# Patient Record
Sex: FEMALE | Race: WHITE | NOT HISPANIC OR LATINO | URBAN - METROPOLITAN AREA
[De-identification: names, ages, dates, MRNs, and addresses within clinical notes are randomized per-mention and may not be internally consistent; named-entity substitution may affect disease eponyms.]

---

## 2023-12-07 ENCOUNTER — TELEPHONE (OUTPATIENT)
Dept: HEMATOLOGY ONCOLOGY | Facility: CLINIC | Age: 38
End: 2023-12-07

## 2023-12-07 NOTE — TELEPHONE ENCOUNTER
I called Brandon Plata in response to a referral that was received for patient to establish care with [facility]nec[facility]cology. Outreach was made to complete patient's intake questionnaire . Patient's intake questionnaire was reviewed and complete. Patient's intake has been sent to the team for clinical review.

## 2023-12-19 ENCOUNTER — OFFICE VISIT (OUTPATIENT)
Dept: GYNECOLOGIC ONCOLOGY | Facility: CLINIC | Age: 38
End: 2023-12-19
Payer: COMMERCIAL

## 2023-12-19 VITALS
HEIGHT: 60 IN | OXYGEN SATURATION: 98 % | TEMPERATURE: 96.8 F | RESPIRATION RATE: 16 BRPM | HEART RATE: 101 BPM | WEIGHT: 212 LBS | DIASTOLIC BLOOD PRESSURE: 68 MMHG | SYSTOLIC BLOOD PRESSURE: 124 MMHG | BODY MASS INDEX: 41.62 KG/M2

## 2023-12-19 DIAGNOSIS — N93.9 ABNORMAL UTERINE BLEEDING (AUB): ICD-10-CM

## 2023-12-19 DIAGNOSIS — N83.8 LEFT TUBO-OVARIAN MASS: Primary | ICD-10-CM

## 2023-12-19 DIAGNOSIS — I10 PRIMARY HYPERTENSION: ICD-10-CM

## 2023-12-19 PROCEDURE — 58100 BIOPSY OF UTERUS LINING: CPT | Performed by: STUDENT IN AN ORGANIZED HEALTH CARE EDUCATION/TRAINING PROGRAM

## 2023-12-19 PROCEDURE — 99244 OFF/OP CNSLTJ NEW/EST MOD 40: CPT | Performed by: OBSTETRICS & GYNECOLOGY

## 2023-12-19 RX ORDER — ACETAMINOPHEN 325 MG/1
975 TABLET ORAL ONCE
OUTPATIENT
Start: 2023-12-19 | End: 2023-12-19

## 2023-12-19 RX ORDER — SODIUM CHLORIDE, SODIUM LACTATE, POTASSIUM CHLORIDE, CALCIUM CHLORIDE 600; 310; 30; 20 MG/100ML; MG/100ML; MG/100ML; MG/100ML
125 INJECTION, SOLUTION INTRAVENOUS CONTINUOUS
OUTPATIENT
Start: 2023-12-19

## 2023-12-19 RX ORDER — LEVOTHYROXINE SODIUM 0.03 MG/1
25 TABLET ORAL DAILY
COMMUNITY
Start: 2023-09-26

## 2023-12-19 RX ORDER — CEFAZOLIN SODIUM 2 G/50ML
2000 SOLUTION INTRAVENOUS ONCE
OUTPATIENT
Start: 2023-12-19 | End: 2023-12-19

## 2023-12-19 RX ORDER — HEPARIN SODIUM 5000 [USP'U]/ML
5000 INJECTION, SOLUTION INTRAVENOUS; SUBCUTANEOUS
OUTPATIENT
Start: 2023-12-20 | End: 2023-12-21

## 2023-12-19 RX ORDER — GABAPENTIN 100 MG/1
100 CAPSULE ORAL ONCE
OUTPATIENT
Start: 2023-12-19 | End: 2023-12-19

## 2023-12-19 RX ORDER — COLISTIN SULFATE, NEOMYCIN SULFATE, THONZONIUM BROMIDE AND HYDROCORTISONE ACETATE 3; 3.3; .5; 1 MG/ML; MG/ML; MG/ML; MG/ML
4 SUSPENSION AURICULAR (OTIC) 3 TIMES DAILY
COMMUNITY
Start: 2023-10-04 | End: 2023-12-19

## 2023-12-19 RX ORDER — AMLODIPINE BESYLATE 5 MG/1
5 TABLET ORAL EVERY MORNING
COMMUNITY
Start: 2023-09-14

## 2023-12-19 RX ORDER — AMLODIPINE BESYLATE 10 MG/1
10 TABLET ORAL DAILY
COMMUNITY
Start: 2023-11-25

## 2023-12-19 RX ORDER — FLUOXETINE HYDROCHLORIDE 20 MG/1
1 CAPSULE ORAL EVERY MORNING
COMMUNITY
Start: 2023-09-13

## 2023-12-19 NOTE — ASSESSMENT & PLAN NOTE
38 y.o.  with HTN, prior CS x2, 12.7cm left ovarian mass with low-level internal echoes consistent with endometrioma on imaging and AUB who presents for consultation for definitive surgical management.     I reviewed the differential diagnosis of ovarian cysts including benign such as dermoid or functional, borderline, malignancy.  Based on MRI report, the cyst most likely represents a benign endometrioma.     Given that patient has completed childbearing and also has ongoing abnormal uterine bleeding. We have recommended robotic-assisted total laparoscopic hysterectomy, left salpingoophorectomy, right salpingectomy, with intraoperative frozen section analysis, possible staging, possible exploratory laparotomy and all other indicated procedures.  She understands the risks and benefits of the operation and agrees to proceed as outlined.  Consent for surgery was obtained by me in the office.     We discussed the implications of endometriosis and associated inflammation and/or scarring rendering the surgery more difficult. We reviewed that we will plan to leave the right ovary in situ unless it appears grossly abnormal or is morbidly adherent to the uterus. She understands that bilateral oophorectomy would place her in surgical menopause. We discussed that we will try to avoid this if possible.     We discussed perioperative management of medications and preadmission testing.    Thank you for the courtesy of this consultation.  All questions were answered by the end of the visit.

## 2023-12-19 NOTE — ASSESSMENT & PLAN NOTE
We reviewed the possible etiologies of her AUB, including polyp, adenomyosis, leiomyoma, hyperplasia/malignancy, coagulopathy, ovulatory dysfunction, endometrial, iatrogenic.   We reviewed in women ages 19-39, AUB is usually as a result of pregnancy, structural lesions, endometrial hyperplasia, or hormonal contraception.  In women age 40 to menopause, the most common cause is anovulatory bleeding as a result of declining ovarian function. Pelvic ultrasound showed no abnormality in the endometrium.     Endometrial biopsy was attempted today but was unable to be obtained secondary to cervical stenosis. As such, we will send the uterus for frozen pathology if abnormal appearing.

## 2023-12-19 NOTE — PATIENT INSTRUCTIONS
1.  Please shower the night before planned surgery.  You may choose to use antimicrobial soap.   2. Do not eat solid food after midnight the evening prior to surgery.  You may continue to clear drink liquids up until 3 hr prior to scheduled surgery.  3.  You may take all your normal medications the morning of surgery.   4. Please with holdsany nonsteroidal anti-inflammatory drugs such as Motrin Advil aspirin Aleve ibuprofen or prescription versions of these for 7 days prior to surgery.

## 2023-12-19 NOTE — ASSESSMENT & PLAN NOTE
Patient reports intermittent dizziness. She reports she has recently uptitrated her amlodipine to 10mg daily. Recommended pt to review dosage with her PCP given dizziness. The dizziness is unlikely to be related to her mass.

## 2023-12-19 NOTE — PROGRESS NOTES
Assessment/Plan:    Problem List Items Addressed This Visit       Left tubo-ovarian mass - Primary     38 y.o.  with HTN, prior CS x2, 12.7cm left ovarian mass with low-level internal echoes consistent with endometrioma on imaging and AUB who presents for consultation for definitive surgical management.     I reviewed the differential diagnosis of ovarian cysts including benign such as dermoid or functional, borderline, malignancy.  Based on MRI report, the cyst most likely represents a benign endometrioma.     Given that patient has completed childbearing and also has ongoing abnormal uterine bleeding. We have recommended robotic-assisted total laparoscopic hysterectomy, left salpingoophorectomy, right salpingectomy, with intraoperative frozen section analysis, possible staging, possible exploratory laparotomy and all other indicated procedures.  She understands the risks and benefits of the operation and agrees to proceed as outlined.  Consent for surgery was obtained by me in the office.     We discussed the implications of endometriosis and associated inflammation and/or scarring rendering the surgery more difficult. We reviewed that we will plan to leave the right ovary in situ unless it appears grossly abnormal or is morbidly adherent to the uterus. She understands that bilateral oophorectomy would place her in surgical menopause. We discussed that we will try to avoid this if possible.     We discussed perioperative management of medications and preadmission testing.    Thank you for the courtesy of this consultation.  All questions were answered by the end of the visit.           Relevant Orders    Case request operating room: HYSTERECTOMY LAPAROSCOPIC TOTAL (LTH) W/ ROBOTICS, BILATERAL SALPINGECTOMY, LEFT OOPHORECTOMY, POSSIBLE RIGHT OOPHORECTOMY, POSSIBLE STAGING, EXPLORATORY LAPAROTOMY, ALL OTHER INDICATED PROCEDURES (Completed)    Type and screen    Basic metabolic panel    CBC and Platelet     HEMOGLOBIN A1C W/ EAG ESTIMATION    EKG 12 lead    Provide patient education for GYN procedure    Provide patient education on NPO Guidelines    Abnormal uterine bleeding (AUB)     We reviewed the possible etiologies of her AUB, including polyp, adenomyosis, leiomyoma, hyperplasia/malignancy, coagulopathy, ovulatory dysfunction, endometrial, iatrogenic.   We reviewed in women ages 19-39, AUB is usually as a result of pregnancy, structural lesions, endometrial hyperplasia, or hormonal contraception.  In women age 40 to menopause, the most common cause is anovulatory bleeding as a result of declining ovarian function. Pelvic ultrasound showed no abnormality in the endometrium.     Endometrial biopsy was attempted today but was unable to be obtained secondary to cervical stenosis. As such, we will send the uterus for frozen pathology if abnormal appearing.          Relevant Orders    Case request operating room: HYSTERECTOMY LAPAROSCOPIC TOTAL (LTH) W/ ROBOTICS, BILATERAL SALPINGECTOMY, LEFT OOPHORECTOMY, POSSIBLE RIGHT OOPHORECTOMY, POSSIBLE STAGING, EXPLORATORY LAPAROTOMY, ALL OTHER INDICATED PROCEDURES (Completed)    Endometrial biopsy    Primary hypertension     Patient reports intermittent dizziness. She reports she has recently uptitrated her amlodipine to 10mg daily. Recommended pt to review dosage with her PCP given dizziness. The dizziness is unlikely to be related to her mass.          Relevant Medications    amLODIPine (NORVASC) 10 mg tablet    amLODIPine (NORVASC) 5 mg tablet           CHIEF COMPLAINT: left ovarian cyst     Oncology Problem:   Cancer Staging   No matching staging information was found for the patient.      Previous therapy:  Oncology History    No history exists.       Most recent imaging:  Outside imaging reports reviewed in Care Everywhere  TVUS  FINDINGS:   LMP: 11/30/2023, day 6 of the cycle     UTERUS: 8.7 cm x 5.0 cm x 5.8 cm     MYOMETRIUM: Homogeneous     ENDOMETRIUM: 8.7 mm in  thickness, within normal limits.     RIGHT OVARY: 3.6 cm x 2.1 cm x 3.3 cm, 13.2 ml   Unremarkable     LEFT OVARY: 12.7 cm x 8.4 cm x 10.6 cm, 588.3 ml   -11.7 x 9.8 x 8.0 cm anechoic lesion with low level internal echoes previously 12.1 cm on prior MRI.     MRI Pelvis  IMPRESSION   1. Large complex cystic lesion in the left ovary, possibly an endometrioma, though mucinous cystadenoma cannot be excluded.  Recommend gyn surgical consultation.   2. Small foci of T1 hyperintensity in the left ovary, which may represent small endometriomas.   3. Tubular region of T1 hyperintensity in the left adnexa, suggestive of a mild hematosalpinx.       Patient ID: Anabel Alvarado is a 38 y.o. female  With abdominal pain, AUB in setting of large 12.7cm left ovarian cyst who presents as a consultation for surgical management.     Patient reports she started noticing pain in August and was sent for CT which showed an ovarian mass. She was then sent for MRI which confirmed left ovarian mass, possible endometrioma and possible hematosalpinx. She then saw Dr. Mitchell for second opinion. Tumor markers were wnl. She continues to have intermittent crampy pain that is cyclic. She also notes heavy menstrual bleeding, passage of clots and intermenstrual bleeding since August. She also notes weight gain, early satiety. 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  She has completed childbearing and desires definitive surgical management.         Review of Systems   Constitutional:  Positive for appetite change. Negative for activity change, chills and fever.   Respiratory:  Negative for shortness of breath.    Cardiovascular:  Negative for chest pain.   Gastrointestinal:  Positive for abdominal pain. Negative for nausea and vomiting.   Genitourinary:  Positive for menstrual problem and pelvic pain.   Musculoskeletal:  Negative for gait problem.  "  Neurological:  Positive for light-headedness. Negative for dizziness and syncope.   Psychiatric/Behavioral:  The patient is nervous/anxious.        Current Outpatient Medications   Medication Sig Dispense Refill    Aloe LIQD Take 4 fluid ounces by mouth every morning      amLODIPine (NORVASC) 10 mg tablet Take 10 mg by mouth daily      FLUoxetine (PROzac) 20 mg capsule Take 1 capsule by mouth every morning      levothyroxine 25 mcg tablet Take 25 mcg by mouth daily      amLODIPine (NORVASC) 5 mg tablet Take 5 mg by mouth every morning (Patient not taking: Reported on 12/19/2023)       No current facility-administered medications for this visit.       Allergies   Allergen Reactions    Sulfa Antibiotics Rash    Sulfamethoxazole-Trimethoprim Rash       No past medical history on file.    No past surgical history on file.    OB History    No obstetric history on file.         No family history on file.    The following portions of the patient's history were reviewed and updated as appropriate: allergies, current medications, past family history, past medical history, past social history, past surgical history, and problem list.      Objective:    Blood pressure 124/68, pulse 101, temperature (!) 96.8 °F (36 °C), temperature source Temporal, resp. rate 16, height 5' (1.524 m), weight 96.2 kg (212 lb), SpO2 98%.  Body mass index is 41.4 kg/m².    Physical Exam      No results found for: \"\"  No results found for: \"WBC\", \"HGB\", \"HCT\", \"MCV\", \"PLT\"  No results found for: \"NA\", \"K\", \"CL\", \"CO2\", \"ANIONGAP\", \"BUN\", \"CREATININE\", \"GLUCOSE\", \"GLUF\", \"CALCIUM\", \"CORRECTEDCA\", \"AST\", \"ALT\", \"ALKPHOS\", \"PROT\", \"BILITOT\", \"EGFR\"     Trend:  No results found for: \"\"    Endometrial biopsy    Date/Time: 12/19/2023 2:15 PM    Performed by: Gabriel Jeter MD  Authorized by: Shadi Strauss MD  Universal Protocol:  Consent: Verbal consent obtained.  Risks and benefits: risks, benefits and alternatives were " discussed  Consent given by: patient    Indication:     Indications: Other disorder of menstruation and other abnormal bleeding from female genital tract    Procedure:     Procedure: endometrial biopsy with Pipelle      A bivalve speculum was placed in the vagina: yes    Findings:     Uterus size:  9-10 weeks    Cervix: stenotic    Comments:     Procedure comments:  Cervix visualized and displaced and angled downward secondary to mass. Anterior lip of the cervix grasped with tenaculum, however unable to advance pipelle or os finder past internal os. Procedure was then terminated, tenaculum removed and noted to be hemostatic.

## 2024-01-21 ENCOUNTER — PATIENT MESSAGE (OUTPATIENT)
Dept: GYNECOLOGIC ONCOLOGY | Facility: CLINIC | Age: 39
End: 2024-01-21

## 2024-01-30 ENCOUNTER — APPOINTMENT (OUTPATIENT)
Dept: LAB | Facility: CLINIC | Age: 39
End: 2024-01-30
Payer: COMMERCIAL

## 2024-01-30 DIAGNOSIS — N83.8 LEFT TUBO-OVARIAN MASS: ICD-10-CM

## 2024-01-30 LAB
ANION GAP SERPL CALCULATED.3IONS-SCNC: 8 MMOL/L
BUN SERPL-MCNC: 8 MG/DL (ref 5–25)
CALCIUM SERPL-MCNC: 8.7 MG/DL (ref 8.4–10.2)
CHLORIDE SERPL-SCNC: 103 MMOL/L (ref 96–108)
CO2 SERPL-SCNC: 29 MMOL/L (ref 21–32)
CREAT SERPL-MCNC: 0.77 MG/DL (ref 0.6–1.3)
ERYTHROCYTE [DISTWIDTH] IN BLOOD BY AUTOMATED COUNT: 13.2 % (ref 11.6–15.1)
EST. AVERAGE GLUCOSE BLD GHB EST-MCNC: 100 MG/DL
GFR SERPL CREATININE-BSD FRML MDRD: 96 ML/MIN/1.73SQ M
GLUCOSE SERPL-MCNC: 93 MG/DL (ref 65–140)
HBA1C MFR BLD: 5.1 %
HCT VFR BLD AUTO: 39.3 % (ref 34.8–46.1)
HGB BLD-MCNC: 13 G/DL (ref 11.5–15.4)
MCH RBC QN AUTO: 27.5 PG (ref 26.8–34.3)
MCHC RBC AUTO-ENTMCNC: 33.1 G/DL (ref 31.4–37.4)
MCV RBC AUTO: 83 FL (ref 82–98)
PLATELET # BLD AUTO: 232 THOUSANDS/UL (ref 149–390)
PMV BLD AUTO: 10 FL (ref 8.9–12.7)
POTASSIUM SERPL-SCNC: 4 MMOL/L (ref 3.5–5.3)
RBC # BLD AUTO: 4.73 MILLION/UL (ref 3.81–5.12)
SODIUM SERPL-SCNC: 140 MMOL/L (ref 135–147)
WBC # BLD AUTO: 6.67 THOUSAND/UL (ref 4.31–10.16)

## 2024-01-30 PROCEDURE — 85027 COMPLETE CBC AUTOMATED: CPT

## 2024-01-30 PROCEDURE — 36415 COLL VENOUS BLD VENIPUNCTURE: CPT

## 2024-01-30 PROCEDURE — 86900 BLOOD TYPING SEROLOGIC ABO: CPT | Performed by: OBSTETRICS & GYNECOLOGY

## 2024-01-30 PROCEDURE — 83036 HEMOGLOBIN GLYCOSYLATED A1C: CPT

## 2024-01-30 PROCEDURE — 86901 BLOOD TYPING SEROLOGIC RH(D): CPT | Performed by: OBSTETRICS & GYNECOLOGY

## 2024-01-30 PROCEDURE — 80048 BASIC METABOLIC PNL TOTAL CA: CPT

## 2024-01-30 PROCEDURE — 86850 RBC ANTIBODY SCREEN: CPT | Performed by: OBSTETRICS & GYNECOLOGY

## 2024-01-31 ENCOUNTER — LAB REQUISITION (OUTPATIENT)
Dept: LAB | Facility: HOSPITAL | Age: 39
End: 2024-01-31
Payer: COMMERCIAL

## 2024-01-31 DIAGNOSIS — N83.8 OTHER NONINFLAMMATORY DISORDERS OF OVARY, FALLOPIAN TUBE AND BROAD LIGAMENT: ICD-10-CM

## 2024-01-31 LAB
ABO GROUP BLD: NORMAL
BLD GP AB SCN SERPL QL: NEGATIVE
RH BLD: POSITIVE
SPECIMEN EXPIRATION DATE: NORMAL

## 2024-02-27 ENCOUNTER — TELEPHONE (OUTPATIENT)
Dept: HEMATOLOGY ONCOLOGY | Facility: CLINIC | Age: 39
End: 2024-02-27

## 2024-02-27 NOTE — TELEPHONE ENCOUNTER
Patient has URI symptoms for a few weeks. No fever. Negative COVID test. Recommended that she schedule a sick visit with her PCP today or tomorrow. She will call back with the outcome.

## 2024-02-27 NOTE — TELEPHONE ENCOUNTER
Patient Call    Who are you speaking with? Patient    If it is not the patient, are they listed on an active communication consent form? N/A   What is the reason for this call? Patient calling to speak with Dr. Strauss.  She still not feeling well and is scheduled for surgery 2/29/24   Does this require a call back? yes   If a call back is required, please list best call back number [phone]   If a call back is required, advise that a message will be forwarded to their care team and someone will return their call as soon as possible.   Did you relay this information to the patient? yes

## 2024-02-28 ENCOUNTER — TELEPHONE (OUTPATIENT)
Dept: HEMATOLOGY ONCOLOGY | Facility: CLINIC | Age: 39
End: 2024-02-28

## 2024-02-28 NOTE — TELEPHONE ENCOUNTER
Patient Call    Who are you speaking with? Patient    If it is not the patient, are they listed on an active communication consent form? N/A   What is the reason for this call? Patient calling to speak with Dr. Strauss regarding her scheduled surgery for tomorrow   Does this require a call back? Yes    If a call back is required, please list best call back number [phone]   If a call back is required, advise that a message will be forwarded to their care team and someone will return their call as soon as possible.   Did you relay this information to the patient? Yes

## 2024-02-28 NOTE — TELEPHONE ENCOUNTER
Return call placed. Patient seen by PCP due to URI symptoms. Patient febrile, with elevated BP and persistent URI symptoms. COVID/FLU pending. PCP recommended delaying surgery.     Bonnie, can you please reach out to the patient to reschedule? Thanks!

## 2024-03-04 ENCOUNTER — TELEPHONE (OUTPATIENT)
Dept: GYNECOLOGIC ONCOLOGY | Facility: CLINIC | Age: 39
End: 2024-03-04

## 2024-03-04 NOTE — TELEPHONE ENCOUNTER
Spoke with patient - surgery rescheduled for 3/21/24.  Instructions provided to patient.  All questions answered.

## 2024-03-05 ENCOUNTER — TELEPHONE (OUTPATIENT)
Dept: HEMATOLOGY ONCOLOGY | Facility: CLINIC | Age: 39
End: 2024-03-05

## 2024-03-05 NOTE — TELEPHONE ENCOUNTER
Patient Call    Who are you speaking with? Patient    If it is not the patient, are they listed on an active communication consent form? Yes   What is the reason for this call? Patient is advising she spoke to Vane yesterday about scheduling her surgery date at 3/21/24 and the post op is supposed to be for 4/22/24 but saw it was scheduled for 3/22/24. Needs to know why is it scheduled for the next day and not 4 weeks out as she was told    Does this require a call back? Yes   If a call back is required, please list best call back number [phone]   If a call back is required, advise that a message will be forwarded to their care team and someone will return their call as soon as possible.   Did you relay this information to the patient? Yes

## 2024-03-06 ENCOUNTER — OFFICE VISIT (OUTPATIENT)
Dept: LAB | Facility: CLINIC | Age: 39
End: 2024-03-06
Payer: COMMERCIAL

## 2024-03-06 ENCOUNTER — APPOINTMENT (OUTPATIENT)
Dept: LAB | Facility: CLINIC | Age: 39
End: 2024-03-06
Payer: COMMERCIAL

## 2024-03-06 ENCOUNTER — LAB REQUISITION (OUTPATIENT)
Dept: LAB | Facility: HOSPITAL | Age: 39
End: 2024-03-06
Payer: COMMERCIAL

## 2024-03-06 DIAGNOSIS — N83.8 LEFT TUBO-OVARIAN MASS: ICD-10-CM

## 2024-03-06 DIAGNOSIS — Z01.818 ENCOUNTER FOR OTHER PREPROCEDURAL EXAMINATION: ICD-10-CM

## 2024-03-06 DIAGNOSIS — Z01.818 OTHER SPECIFIED PRE-OPERATIVE EXAMINATION: ICD-10-CM

## 2024-03-06 PROCEDURE — 86900 BLOOD TYPING SEROLOGIC ABO: CPT | Performed by: OBSTETRICS & GYNECOLOGY

## 2024-03-06 PROCEDURE — 86850 RBC ANTIBODY SCREEN: CPT | Performed by: OBSTETRICS & GYNECOLOGY

## 2024-03-06 PROCEDURE — 93005 ELECTROCARDIOGRAM TRACING: CPT

## 2024-03-06 PROCEDURE — 86901 BLOOD TYPING SEROLOGIC RH(D): CPT | Performed by: OBSTETRICS & GYNECOLOGY

## 2024-03-06 PROCEDURE — 36415 COLL VENOUS BLD VENIPUNCTURE: CPT

## 2024-03-07 LAB
ATRIAL RATE: 59 BPM
P AXIS: 40 DEGREES
PR INTERVAL: 126 MS
QRS AXIS: 6 DEGREES
QRSD INTERVAL: 100 MS
QT INTERVAL: 442 MS
QTC INTERVAL: 437 MS
T WAVE AXIS: 2 DEGREES
VENTRICULAR RATE: 59 BPM

## 2024-03-07 PROCEDURE — 93010 ELECTROCARDIOGRAM REPORT: CPT | Performed by: INTERNAL MEDICINE

## 2024-03-11 ENCOUNTER — ANESTHESIA EVENT (OUTPATIENT)
Dept: PERIOP | Facility: HOSPITAL | Age: 39
End: 2024-03-11
Payer: COMMERCIAL

## 2024-03-12 RX ORDER — LOSARTAN POTASSIUM 50 MG/1
50 TABLET ORAL DAILY
COMMUNITY
Start: 2024-02-12

## 2024-03-12 NOTE — PRE-PROCEDURE INSTRUCTIONS
Pre-Surgery Instructions:   Medication Instructions    Aloe LIQD Hold day of surgery.    amLODIPine (NORVASC) 10 mg tablet Take day of surgery.    FLUoxetine (PROzac) 20 mg capsule Take day of surgery.    ibuprofen (MOTRIN) 400 mg tablet Stop taking 3 days prior to surgery.    levothyroxine 25 mcg tablet Take day of surgery.    losartan (COZAAR) 50 mg tablet Hold day of surgery.   Medication instructions for day surgery reviewed. Please use only a sip of water to take your instructed medications. Avoid all over the counter vitamins, supplements and NSAIDS for one week prior to surgery per anesthesia guidelines. Tylenol is ok to take as needed.     You will receive a call one business day prior to surgery with an arrival time and hospital directions. If your surgery is scheduled on a Monday, the hospital will be calling you on the Friday prior to your surgery. If you have not heard from anyone by 8pm, please call the hospital supervisor through the hospital  at [phone]. ([facility] [phone] or [facility] [phone]).    Do not eat or drink anything after midnight the night before your surgery, including candy, mints, lifesavers, or chewing gum. Do not drink alcohol 24hrs before your surgery. Try not to smoke at least 24hrs before your surgery.       Follow the pre surgery showering instructions as listed in the “My Surgical Experience Booklet” or otherwise provided by your surgeon's office. Do not use a blade to shave the surgical area 1 week before surgery. It is okay to use a clean electric clippers up to 24 hours before surgery. Do not apply any lotions, creams, including makeup, cologne, deodorant, or perfumes after showering on the day of your surgery. Do not use dry shampoo, hair spray, hair gel, or any type of hair products.     No contact lenses, eye make-up, or artificial eyelashes. Remove nail polish, including gel polish, and any artificial, gel, or acrylic nails if possible. Remove all  jewelry including rings and body piercing jewelry.     Wear causal clothing that is easy to take on and off. Consider your type of surgery.    Keep any valuables, jewelry, piercings at home. Please bring any specially ordered equipment (sling, braces) if indicated.    Arrange for a responsible person to drive you to and from the hospital on the day of your surgery. Please confirm the visitor policy for the day of your procedure when you receive your phone call with an arrival time.     Call the surgeon's office with any new illnesses, exposures, or additional questions prior to surgery.    Please reference your “My Surgical Experience Booklet” for additional information to prepare for your upcoming surgery.    Advise Refer to Weight Management Program not interested.

## 2024-03-21 ENCOUNTER — ANESTHESIA (OUTPATIENT)
Dept: PERIOP | Facility: HOSPITAL | Age: 39
End: 2024-03-21
Payer: COMMERCIAL

## 2024-03-21 ENCOUNTER — HOSPITAL ENCOUNTER (OUTPATIENT)
Facility: HOSPITAL | Age: 39
Setting detail: OUTPATIENT SURGERY
Discharge: HOME | End: 2024-03-21
Attending: OBSTETRICS & GYNECOLOGY | Admitting: OBSTETRICS & GYNECOLOGY
Payer: COMMERCIAL

## 2024-03-21 VITALS
OXYGEN SATURATION: 94 % | HEIGHT: 60 IN | BODY MASS INDEX: 43.39 KG/M2 | DIASTOLIC BLOOD PRESSURE: 84 MMHG | TEMPERATURE: 98.5 F | SYSTOLIC BLOOD PRESSURE: 142 MMHG | HEART RATE: 109 BPM | WEIGHT: 221 LBS | RESPIRATION RATE: 16 BRPM

## 2024-03-21 DIAGNOSIS — N93.9 ABNORMAL UTERINE BLEEDING (AUB): ICD-10-CM

## 2024-03-21 DIAGNOSIS — N83.8 LEFT TUBO-OVARIAN MASS: ICD-10-CM

## 2024-03-21 PROBLEM — Z90.710 S/P HYSTERECTOMY WITH OOPHORECTOMY: Status: ACTIVE | Noted: 2024-03-21

## 2024-03-21 PROBLEM — Z90.721 S/P HYSTERECTOMY WITH OOPHORECTOMY: Status: ACTIVE | Noted: 2024-03-21

## 2024-03-21 LAB
ABO GROUP BLD: NORMAL
BLD GP AB SCN SERPL QL: NEGATIVE
EXT PREGNANCY TEST URINE: NEGATIVE
EXT. CONTROL: NORMAL
GLUCOSE SERPL-MCNC: 160 MG/DL (ref 65–140)
RH BLD: POSITIVE
SPECIMEN EXPIRATION DATE: NORMAL

## 2024-03-21 PROCEDURE — 88329 PATH CONSLTJ DRG SURG: CPT | Performed by: STUDENT IN AN ORGANIZED HEALTH CARE EDUCATION/TRAINING PROGRAM

## 2024-03-21 PROCEDURE — 86850 RBC ANTIBODY SCREEN: CPT | Performed by: OBSTETRICS & GYNECOLOGY

## 2024-03-21 PROCEDURE — 88302 TISSUE EXAM BY PATHOLOGIST: CPT | Performed by: STUDENT IN AN ORGANIZED HEALTH CARE EDUCATION/TRAINING PROGRAM

## 2024-03-21 PROCEDURE — NC001 PR NO CHARGE: Performed by: PHYSICIAN ASSISTANT

## 2024-03-21 PROCEDURE — 58571 TLH W/T/O 250 G OR LESS: CPT | Performed by: OBSTETRICS & GYNECOLOGY

## 2024-03-21 PROCEDURE — 81025 URINE PREGNANCY TEST: CPT | Performed by: OBSTETRICS & GYNECOLOGY

## 2024-03-21 PROCEDURE — 88307 TISSUE EXAM BY PATHOLOGIST: CPT | Performed by: STUDENT IN AN ORGANIZED HEALTH CARE EDUCATION/TRAINING PROGRAM

## 2024-03-21 PROCEDURE — S2900 ROBOTIC SURGICAL SYSTEM: HCPCS | Performed by: OBSTETRICS & GYNECOLOGY

## 2024-03-21 PROCEDURE — NC001 PR NO CHARGE: Performed by: OBSTETRICS & GYNECOLOGY

## 2024-03-21 PROCEDURE — 82948 REAGENT STRIP/BLOOD GLUCOSE: CPT

## 2024-03-21 PROCEDURE — 86901 BLOOD TYPING SEROLOGIC RH(D): CPT | Performed by: OBSTETRICS & GYNECOLOGY

## 2024-03-21 PROCEDURE — 86900 BLOOD TYPING SEROLOGIC ABO: CPT | Performed by: OBSTETRICS & GYNECOLOGY

## 2024-03-21 PROCEDURE — 38570 LAPAROSCOPY LYMPH NODE BIOP: CPT | Performed by: OBSTETRICS & GYNECOLOGY

## 2024-03-21 PROCEDURE — 88305 TISSUE EXAM BY PATHOLOGIST: CPT | Performed by: STUDENT IN AN ORGANIZED HEALTH CARE EDUCATION/TRAINING PROGRAM

## 2024-03-21 RX ORDER — HEPARIN SODIUM 5000 [USP'U]/ML
5000 INJECTION, SOLUTION INTRAVENOUS; SUBCUTANEOUS
Status: DISCONTINUED | OUTPATIENT
Start: 2024-03-21 | End: 2024-03-21

## 2024-03-21 RX ORDER — CEFAZOLIN SODIUM 2 G/50ML
2000 SOLUTION INTRAVENOUS ONCE
Status: DISCONTINUED | OUTPATIENT
Start: 2024-03-21 | End: 2024-03-21

## 2024-03-21 RX ORDER — ROCURONIUM BROMIDE 10 MG/ML
INJECTION, SOLUTION INTRAVENOUS AS NEEDED
Status: DISCONTINUED | OUTPATIENT
Start: 2024-03-21 | End: 2024-03-21

## 2024-03-21 RX ORDER — LIDOCAINE HYDROCHLORIDE 10 MG/ML
INJECTION, SOLUTION EPIDURAL; INFILTRATION; INTRACAUDAL; PERINEURAL AS NEEDED
Status: DISCONTINUED | OUTPATIENT
Start: 2024-03-21 | End: 2024-03-21

## 2024-03-21 RX ORDER — FENTANYL CITRATE/PF 50 MCG/ML
50 SYRINGE (ML) INJECTION
Status: DISCONTINUED | OUTPATIENT
Start: 2024-03-21 | End: 2024-03-21 | Stop reason: HOSPADM

## 2024-03-21 RX ORDER — HEPARIN SODIUM 5000 [USP'U]/ML
5000 INJECTION, SOLUTION INTRAVENOUS; SUBCUTANEOUS
Status: COMPLETED | OUTPATIENT
Start: 2024-03-21 | End: 2024-03-21

## 2024-03-21 RX ORDER — MIDAZOLAM HYDROCHLORIDE 2 MG/2ML
INJECTION, SOLUTION INTRAMUSCULAR; INTRAVENOUS AS NEEDED
Status: DISCONTINUED | OUTPATIENT
Start: 2024-03-21 | End: 2024-03-21

## 2024-03-21 RX ORDER — ACETAMINOPHEN 325 MG/1
975 TABLET ORAL EVERY 8 HOURS SCHEDULED
Status: DISCONTINUED | OUTPATIENT
Start: 2024-03-21 | End: 2024-03-21 | Stop reason: HOSPADM

## 2024-03-21 RX ORDER — ACETAMINOPHEN 10 MG/ML
1000 INJECTION, SOLUTION INTRAVENOUS ONCE
Status: DISCONTINUED | OUTPATIENT
Start: 2024-03-21 | End: 2024-03-21

## 2024-03-21 RX ORDER — DEXAMETHASONE SODIUM PHOSPHATE 10 MG/ML
INJECTION, SOLUTION INTRAMUSCULAR; INTRAVENOUS AS NEEDED
Status: DISCONTINUED | OUTPATIENT
Start: 2024-03-21 | End: 2024-03-21

## 2024-03-21 RX ORDER — ONDANSETRON 2 MG/ML
INJECTION INTRAMUSCULAR; INTRAVENOUS AS NEEDED
Status: DISCONTINUED | OUTPATIENT
Start: 2024-03-21 | End: 2024-03-21

## 2024-03-21 RX ORDER — OXYCODONE HYDROCHLORIDE 5 MG/1
5 TABLET ORAL EVERY 4 HOURS PRN
Status: DISCONTINUED | OUTPATIENT
Start: 2024-03-21 | End: 2024-03-21 | Stop reason: HOSPADM

## 2024-03-21 RX ORDER — FENTANYL CITRATE 50 UG/ML
INJECTION, SOLUTION INTRAMUSCULAR; INTRAVENOUS AS NEEDED
Status: DISCONTINUED | OUTPATIENT
Start: 2024-03-21 | End: 2024-03-21

## 2024-03-21 RX ORDER — EPHEDRINE SULFATE 50 MG/ML
INJECTION INTRAVENOUS AS NEEDED
Status: DISCONTINUED | OUTPATIENT
Start: 2024-03-21 | End: 2024-03-21

## 2024-03-21 RX ORDER — CEFAZOLIN SODIUM 2 G/50ML
2000 SOLUTION INTRAVENOUS ONCE
Status: COMPLETED | OUTPATIENT
Start: 2024-03-21 | End: 2024-03-21

## 2024-03-21 RX ORDER — MAGNESIUM HYDROXIDE 1200 MG/15ML
LIQUID ORAL AS NEEDED
Status: DISCONTINUED | OUTPATIENT
Start: 2024-03-21 | End: 2024-03-21 | Stop reason: HOSPADM

## 2024-03-21 RX ORDER — GABAPENTIN 100 MG/1
100 CAPSULE ORAL ONCE
Status: COMPLETED | OUTPATIENT
Start: 2024-03-21 | End: 2024-03-21

## 2024-03-21 RX ORDER — BUPIVACAINE HYDROCHLORIDE 2.5 MG/ML
INJECTION, SOLUTION EPIDURAL; INFILTRATION; INTRACAUDAL AS NEEDED
Status: DISCONTINUED | OUTPATIENT
Start: 2024-03-21 | End: 2024-03-21 | Stop reason: HOSPADM

## 2024-03-21 RX ORDER — GLYCOPYRROLATE 0.2 MG/ML
INJECTION INTRAMUSCULAR; INTRAVENOUS AS NEEDED
Status: DISCONTINUED | OUTPATIENT
Start: 2024-03-21 | End: 2024-03-21

## 2024-03-21 RX ORDER — METRONIDAZOLE 500 MG/100ML
500 INJECTION, SOLUTION INTRAVENOUS EVERY 8 HOURS
Status: DISCONTINUED | OUTPATIENT
Start: 2024-03-21 | End: 2024-03-21 | Stop reason: HOSPADM

## 2024-03-21 RX ORDER — PROPOFOL 10 MG/ML
INJECTION, EMULSION INTRAVENOUS AS NEEDED
Status: DISCONTINUED | OUTPATIENT
Start: 2024-03-21 | End: 2024-03-21

## 2024-03-21 RX ORDER — HYDROMORPHONE HCL/PF 1 MG/ML
SYRINGE (ML) INJECTION AS NEEDED
Status: DISCONTINUED | OUTPATIENT
Start: 2024-03-21 | End: 2024-03-21

## 2024-03-21 RX ORDER — ONDANSETRON 2 MG/ML
4 INJECTION INTRAMUSCULAR; INTRAVENOUS ONCE AS NEEDED
Status: DISCONTINUED | OUTPATIENT
Start: 2024-03-21 | End: 2024-03-21 | Stop reason: HOSPADM

## 2024-03-21 RX ORDER — IBUPROFEN 600 MG/1
600 TABLET ORAL EVERY 6 HOURS SCHEDULED
Status: DISCONTINUED | OUTPATIENT
Start: 2024-03-21 | End: 2024-03-21 | Stop reason: HOSPADM

## 2024-03-21 RX ORDER — SODIUM CHLORIDE, SODIUM LACTATE, POTASSIUM CHLORIDE, CALCIUM CHLORIDE 600; 310; 30; 20 MG/100ML; MG/100ML; MG/100ML; MG/100ML
75 INJECTION, SOLUTION INTRAVENOUS CONTINUOUS
Status: DISCONTINUED | OUTPATIENT
Start: 2024-03-21 | End: 2024-03-21 | Stop reason: HOSPADM

## 2024-03-21 RX ORDER — ACETAMINOPHEN 325 MG/1
975 TABLET ORAL ONCE
Status: DISCONTINUED | OUTPATIENT
Start: 2024-03-21 | End: 2024-03-21

## 2024-03-21 RX ORDER — SODIUM CHLORIDE, SODIUM LACTATE, POTASSIUM CHLORIDE, CALCIUM CHLORIDE 600; 310; 30; 20 MG/100ML; MG/100ML; MG/100ML; MG/100ML
125 INJECTION, SOLUTION INTRAVENOUS CONTINUOUS
Status: DISCONTINUED | OUTPATIENT
Start: 2024-03-21 | End: 2024-03-21 | Stop reason: HOSPADM

## 2024-03-21 RX ORDER — ESMOLOL HYDROCHLORIDE 10 MG/ML
INJECTION INTRAVENOUS AS NEEDED
Status: DISCONTINUED | OUTPATIENT
Start: 2024-03-21 | End: 2024-03-21

## 2024-03-21 RX ORDER — ONDANSETRON 2 MG/ML
4 INJECTION INTRAMUSCULAR; INTRAVENOUS ONCE AS NEEDED
Status: COMPLETED | OUTPATIENT
Start: 2024-03-21 | End: 2024-03-21

## 2024-03-21 RX ADMIN — ONDANSETRON 4 MG: 2 INJECTION INTRAMUSCULAR; INTRAVENOUS at 13:15

## 2024-03-21 RX ADMIN — ROCURONIUM BROMIDE 10 MG: 10 INJECTION, SOLUTION INTRAVENOUS at 15:13

## 2024-03-21 RX ADMIN — DEXAMETHASONE SODIUM PHOSPHATE 5 MG: 10 INJECTION, SOLUTION INTRAMUSCULAR; INTRAVENOUS at 13:15

## 2024-03-21 RX ADMIN — ONDANSETRON 4 MG: 2 INJECTION INTRAMUSCULAR; INTRAVENOUS at 17:38

## 2024-03-21 RX ADMIN — EPHEDRINE SULFATE 10 MG: 50 INJECTION, SOLUTION INTRAVENOUS at 16:26

## 2024-03-21 RX ADMIN — EPHEDRINE SULFATE 10 MG: 50 INJECTION, SOLUTION INTRAVENOUS at 13:21

## 2024-03-21 RX ADMIN — PROPOFOL 200 MG: 10 INJECTION, EMULSION INTRAVENOUS at 13:09

## 2024-03-21 RX ADMIN — HEPARIN SODIUM 5000 UNITS: 5000 INJECTION INTRAVENOUS; SUBCUTANEOUS at 10:38

## 2024-03-21 RX ADMIN — SUGAMMADEX 100 MG: 100 INJECTION, SOLUTION INTRAVENOUS at 16:12

## 2024-03-21 RX ADMIN — ESMOLOL HYDROCHLORIDE 10 MG: 100 INJECTION, SOLUTION INTRAVENOUS at 13:44

## 2024-03-21 RX ADMIN — LIDOCAINE HYDROCHLORIDE 50 MG: 10 INJECTION, SOLUTION EPIDURAL; INFILTRATION; INTRACAUDAL at 13:09

## 2024-03-21 RX ADMIN — CEFAZOLIN SODIUM 2000 MG: 2 SOLUTION INTRAVENOUS at 13:23

## 2024-03-21 RX ADMIN — MIDAZOLAM 2 MG: 1 INJECTION INTRAMUSCULAR; INTRAVENOUS at 13:04

## 2024-03-21 RX ADMIN — HYDROMORPHONE HYDROCHLORIDE 0.5 MG: 1 INJECTION, SOLUTION INTRAMUSCULAR; INTRAVENOUS; SUBCUTANEOUS at 13:41

## 2024-03-21 RX ADMIN — SUGAMMADEX 100 MG: 100 INJECTION, SOLUTION INTRAVENOUS at 16:15

## 2024-03-21 RX ADMIN — GABAPENTIN 100 MG: 100 CAPSULE ORAL at 10:37

## 2024-03-21 RX ADMIN — ROCURONIUM BROMIDE 20 MG: 10 INJECTION, SOLUTION INTRAVENOUS at 13:35

## 2024-03-21 RX ADMIN — SODIUM CHLORIDE, SODIUM LACTATE, POTASSIUM CHLORIDE, AND CALCIUM CHLORIDE 125 ML/HR: .6; .31; .03; .02 INJECTION, SOLUTION INTRAVENOUS at 11:27

## 2024-03-21 RX ADMIN — GLYCOPYRROLATE 0.2 MG: 0.2 INJECTION, SOLUTION INTRAMUSCULAR; INTRAVENOUS at 13:19

## 2024-03-21 RX ADMIN — FENTANYL CITRATE 50 MCG: 50 INJECTION INTRAMUSCULAR; INTRAVENOUS at 13:09

## 2024-03-21 RX ADMIN — ROCURONIUM BROMIDE 50 MG: 10 INJECTION, SOLUTION INTRAVENOUS at 13:09

## 2024-03-21 RX ADMIN — IBUPROFEN 600 MG: 600 TABLET, FILM COATED ORAL at 17:50

## 2024-03-21 RX ADMIN — EPHEDRINE SULFATE 20 MG: 50 INJECTION, SOLUTION INTRAVENOUS at 13:22

## 2024-03-21 RX ADMIN — SUGAMMADEX 100 MG: 100 INJECTION, SOLUTION INTRAVENOUS at 16:18

## 2024-03-21 RX ADMIN — ESMOLOL HYDROCHLORIDE 10 MG: 100 INJECTION, SOLUTION INTRAVENOUS at 13:48

## 2024-03-21 RX ADMIN — ROCURONIUM BROMIDE 20 MG: 10 INJECTION, SOLUTION INTRAVENOUS at 15:49

## 2024-03-21 RX ADMIN — METRONIDAZOLE: 500 SOLUTION INTRAVENOUS at 13:23

## 2024-03-21 RX ADMIN — ROCURONIUM BROMIDE 10 MG: 10 INJECTION, SOLUTION INTRAVENOUS at 14:44

## 2024-03-21 RX ADMIN — FENTANYL CITRATE 50 MCG: 50 INJECTION INTRAMUSCULAR; INTRAVENOUS at 13:33

## 2024-03-21 RX ADMIN — SODIUM CHLORIDE, SODIUM LACTATE, POTASSIUM CHLORIDE, AND CALCIUM CHLORIDE: .6; .31; .03; .02 INJECTION, SOLUTION INTRAVENOUS at 16:09

## 2024-03-21 NOTE — DISCHARGE INSTR - AVS FIRST PAGE
[facility] - Gynecologic Oncology  Rica Naranjo, Bello Coffey  [phone]    Robotic hysterectomy, bilateral salpingectomy, left oophorectomy, left external iliac lymphadenectomy and cystoscopy  Discharge Instructions    WHAT YOU NEED TO KNOW:   As planned, today we removed your uterus, bilateral fallopian tubes, cystic left ovarian mass (which appeared benign).  In addition, some lymph nodes on the left side of the pelvis appeared slightly enlarged and those were removed for pathologic assessment.  This is not necessarily concerning and results will be available in 7 to 10 days and discuss at your postoperative office visit.  At the end of procedure we look inside the bladder and there was no evidence of injuries or abnormalities.    DISCHARGE INSTRUCTIONS:     Ice packs to abdominal incisions for the first 24 to 48 hours to manage discomfort and pain.    If your pain is not controlled with the measures recommended below contact Dr. Strauss directly on his cell phone at [phone].  For other questions or concerns call the office day or night at [phone].    Contact your doctor at the number above if:   You have a fever over 101o.  You have nausea or are vomiting that does not improve after a light meal.   Your pain is getting worse, even after you take medicine.   You feel pain or burning when you urinate, or you have trouble urinating.   You have pus or a foul-smelling odor coming from your vagina and/or wound.    Your wound is red, swollen, or draining pus.  You see new or an increased amount of bright red blood coming from your vagina or your incisions.   You have questions or concerns about your condition or care.    Seek care immediately:   Your arm or leg feels warm, tender, and painful. It may look swollen and red.  You have increasing abdominal or pelvic pain.   You have heavy vaginal bleeding that fills 1 or more sanitary pads in 1 hour.    Call 911 for any  of the following:   You feel lightheaded, short of breath, and have chest pain.   You cough up blood.    Medicines: You may need any of the following:  Prescription medicine will not be given for this procedure.    Resume your previous medications as directed.  Extra-strength Tylenol:  This medications over-the-counter.  Take 2 tablets every 6 hours as needed for mild-to-moderate pain.  Ibuprofen/Advil/Motrin 200 mg tablets:  This medication is over-the-counter.  Take 3 tablets every 8 hours as needed for moderate to severe pain.  Take this medication with food.  The drink plenty of fluids while using this medication to prevent kidney injury.  Metamucil or MiraLax:  This product is over-the-counter.  Distal 1 large tbsp in a large glass of water.  Use once or twice a day for 1-2 weeks prevent and or treat constipation.    Take your medicines as directed. Contact your healthcare provider if you think your medicine is not helping or if you have side effects. Tell him or her if you are allergic to any medicine. Keep a list of the medicines, vitamins, and herbs you take. Include the amounts, and when and why you take them. Bring the list or the pill bottles to follow-up visits. Carry your medicine list with you in case of an emergency.    Activity:   Rest as needed. Get up and move around as directed to help prevent blood clots. Start with short walks and slowly increase the distance every day. Limit the number of times you climb stairs to 2 times each day for the first week. Plan most of your daily activities on one level of your home.      Do not lift objects heavier than 10 pounds until seen in the office for your follow-up appointment.      Do not strain during bowel movements. High-fiber foods and extra liquids can help you prevent constipation. Examples of high-fiber foods are fruit and bran. Prune juice and water are good liquids to drink.      Do not have sex, use tampons, or douche and do not do tub  baths/swimming until cleared by your physician at your postoperative appointment.    You may shower as soon as the day after surgery.  Do not go into pools or hot tubs until cleared by your doctor.      Ask when it is safe for you to drive. It is generally safe to drive as soon as your legs are strong, you are off pain medicines and you feel like you will have the appropriate reflexes to step on the breaks in case of an emergency.    Ask when you may return to work and to other regular activities.    Wound care: Care for your abdominal incisions as directed. Carefully wash around the wound with soap and water. If you have Hibiclens or medicated soap that you were instructed to use before surgery, you may use that to wash with for up to 2 days after surgery.  If not, any mild non-scented, non-abrasive soap is safe.  It is okay to let the soap and water run over your incision. Do not scrub your incision. Dry the area and put on new, clean bandages as directed. Change your bandages when they get wet or dirty. If you have strips of medical tape, let them fall off on their own. It may take 7 to 14 days for them to fall off. Check your incision every day for redness, swelling, or pus.   Deep breathing: Take deep breaths and cough 10 times each hour. This will decrease your risk for a lung infection. Take a deep breath and hold it for as long as you can. Let the air out and then cough strongly. Deep breaths help open your airway. You may be given an incentive spirometer to help you take deep breaths. Put the plastic piece in your mouth and take a slow, deep breath, then let the air out and cough. Repeat these steps 10 times every hour.   Get support: This surgery may be life-changing for you and your family. You will no longer be able to get pregnant. Sudden changes in the levels of your hormones may occur and cause mood swings and depression. You may feel angry, sad, or frightened, or cry frequently and unexpectedly.  These feelings are normal. Talk to your healthcare provider about where you can get support. You can also ask if hormone replacement medicine is right for you.   Follow up with your healthcare provider or gynecologist as directed: You may need to return to have stitches removed, and for other tests. Write down your questions so you remember to ask them during your visits.      © 2017 [employer] Information is for End User's use only and may not be sold, redistributed or otherwise used for commercial purposes. All illustrations and images included in CareNotes® are the copyrighted property of [employer] or [employer].  The above information is an  only. It is not intended as medical advice for individual conditions or treatments. Talk to your doctor, nurse or pharmacist before following any medical regimen to see if it is safe and effective for you.

## 2024-03-21 NOTE — H&P
H&P Exam - Gynecologic Oncology   Anabel Alvarado 38 y.o. female MRN: [id number]  Unit/Bed#: OR [place] Encounter: [id number]    Assessment/Plan     #1.  Left ovarian mass: After counseling, patient has elected to undergo definitive surgery.  She presents today for robotic hysterectomy, resection of pelvic mass/left salpingo-oophorectomy and possible bilateral salpingo-oophorectomy and all other indicated procedures.  She understands the possibility of laparotomy.  Today we discussed the possibility of findings suggesting high risk for endometriosis recurrence with contralateral ovarian preservation.  Undergo circumstances, she wishes for both ovaries to be removed.    Preoperative testing results noted.      The patient was counseled regarding indications, risks, benefits and alternatives to surgical management.  We discussed risks including but not limited to bleeding and potential need for blood transfusions, infection, pain, injury to surrounding organs such as bladder, intestines, ureters and neurovascular structures.  Patient understands potential risks associated with stress of surgery and general anesthesia including but not limited to acute cardiac events, venous thromboembolism, etc.  Patient consents for blood transfusions if those are deemed necessary during the course of care.  She understands risks include but are not limited to hypersensitivity reactions and infection with blood-borne pathogens.  Patient verbalizes understanding, agrees and wants to proceed.  Informed consent form signed. All questions answered to patient's satisfaction.        History of Present Illness   HX and PE limited by:   HPI:  Anabel Alvarado is a 38 y.o. female with 2 prior  sections and a 13 cm left ovarian mass suggestive of endometriosis.  She also has abnormal uterine bleeding.  Presents today for definitive surgical management.    Review of Systems  As per HPI.  12 review of systems is otherwise  noncontributory.  Historical Information     Past Medical History:   Diagnosis Date    Anxiety     Disease of thyroid gland     Hypertension     PONV (postoperative nausea and vomiting)      Past Surgical History:   Procedure Laterality Date     SECTION      x 2    KNEE SURGERY Right      OB/GYN History: Per HPI  Family History   Adopted: Yes   Family history unknown: Yes     Social History   Social History     Substance and Sexual Activity   Alcohol Use Not Currently     Social History     Substance and Sexual Activity   Drug Use Not Currently     Social History     Tobacco Use   Smoking Status Former    Current packs/day: 1.00    Average packs/day: 1 pack/day for 19.2 years (19.2 ttl pk-yrs)    Types: Cigarettes    Start date:     Passive exposure: Past   Smokeless Tobacco Never     E-Cigarette/Vaping    E-Cigarette Use Never User      E-Cigarette/Vaping Substances    Nicotine No     THC No     CBD No     Flavoring No     Other No     Unknown No        Meds/Allergies   all current active meds have been reviewed  Allergies   Allergen Reactions    Sulfa Antibiotics Rash    Sulfamethoxazole-Trimethoprim Rash       Objective   Vitals: Blood pressure 150/86, pulse 80, temperature 98.4 °F (36.9 °C), temperature source Temporal, resp. rate 18, height 5' (1.524 m), weight 100 kg (221 lb), last menstrual period 2024, SpO2 98%.    No intake or output data in the 24 hours ending 24 1209    Invasive Devices:   Peripheral IV 24 Dorsal (posterior);Right Hand (Active)   Site Assessment WDL;Clean;Dry;Intact 24 1126   Dressing Type Transparent 24 1126   Line Status Blood return noted;Infusing 24 1126   Dressing Status Clean;Dry;Intact 24 1126       Physical Exam  Vitals reviewed.   Constitutional:       General: She is not in acute distress.     Appearance: Normal appearance. She is obese. She is not toxic-appearing.   Cardiovascular:      Rate and Rhythm: Normal rate and  regular rhythm.   Pulmonary:      Effort: Pulmonary effort is normal. No respiratory distress.      Breath sounds: No stridor.   Abdominal:      General: There is no distension.      Palpations: Abdomen is soft. There is no mass.      Tenderness: There is no abdominal tenderness. There is no guarding.   Genitourinary:     Comments: Deferred to OR.  Musculoskeletal:      Right lower leg: No edema.      Left lower leg: No edema.           Lab Results: I have personally reviewed pertinent reports.    Imaging: I have personally reviewed pertinent films in PACS    Shadi Strauss MD, FACOG, FACS  3/21/2024  12:12 PM

## 2024-03-21 NOTE — OP NOTE
OPERATIVE REPORT  PATIENT NAME: Anabel Alvarado    :  1985  MRN: [id number]  Pt Location: [facility] OR ROOM 15    SURGERY DATE: 3/21/2024    Surgeons and Role:     * Shadi Strauss MD - Primary     * Rusty López PA-C - Assisting     * Rebekah Baez PA-C - Assisting     * Nelson Greer MD - Assisting     * Gabriel Jeter MD - Assisting    Preop Diagnosis:  Abnormal uterine bleeding (AUB) [N93.9]  Left tubo-ovarian mass [N83.8]    Post-Op Diagnosis Codes:     * Abnormal uterine bleeding (AUB) [N93.9]     * Left tubo-ovarian mass [N83.8]    Procedure(s):  HYSTERECTOMY LAPAROSCOPIC TOTAL (LTH) W/ ROBOTICS. BILATERAL SALPINGECTOMY. LEFT OOPHORECTOMY. LEFT EXTERNAL ILIAC LYMPHADENECTOMY  CYSTOSCOPY    Specimen(s):  ID Type Source Tests Collected by Time Destination   1 : LEFT EXTERNAL ILIAC ENLARGED LYMPH NODES Tissue Lymph Node TISSUE EXAM Shadi Strauss MD 3/21/2024 1424    2 :  Tissue Fallopian Tube, Right TISSUE EXAM Shadi Strauss MD 3/21/2024 1447    3 : left fallopian tube and left ovary Tissue Ovary, Left TISSUE EXAM Shadi Strauss MD 3/21/2024 1515    4 : uterus and cervix Tissue Uterus TISSUE EXAM Shadi Strauss MD 3/21/2024 1515        Estimated Blood Loss:   Minimal    Drains:  Urethral Catheter Non-latex 16 Fr. (Active)   Number of days: 0       Anesthesia Type:   General    Operative Indications:  Abnormal uterine bleeding (AUB) [N93.9]  Left tubo-ovarian mass [N83.8]      Operative Findings:  #1.  Approximately 10 to 12 weeks size uterus with bulky/widened appearance suggestive of adenomyosis.  #2.  Grossly normal right ovary and fallopian tube.  #3.  Retroperitonealized approximately 10 to 12 cm cystic left ovarian mass.  The mass ruptured during dissection with release of dark straw-colored material suggestive of old hemorrhage.  Cystic lining with no solid components to freeze suggesting benign pathology.  The mass was completely excised without  difficulty.  #4.  Enlarged and matted left external iliac lymph nodes.  Indicated left external iliac lymphadenectomy performed.  #5.  Cystoscopy demonstrated normal bladder mucosa and bilateral potent ureteral urine jets.  #6.  Approximately 1.5 cm rubbery white appearing epithelial flat lesion on anterior vaginal wall suggestive of small leiomyoma versus fibroma.  Asymptomatic.  Left in situ.    Complications:   None    Procedure and Technique:  The patient was taken to the operating room where general endotracheal anesthesia was induced without complications. The patient received antibiotic prophylaxis per hospital protocol.  Sequential compression devices were applied to the lower extremities and activated prior to induction of anesthesia. A single dose of preoperative heparin was administered. The patient was placed in the dorsolithotomy position in [facility] and her lower abdomen, perineum and vagina were prepped and draped in the usual sterile fashion. Under direct visualization the uterus was sounded to approximately 12 cm. The endocervix was dilated. A TING uterine manipulator was secured in place with a stitch of 0 Vicryl.  Kemp catheter was placed and the intravaginal occluder balloon was inflated.     Attention was turned to the abdomen. All port sites were infiltrated with bupivacaine at the beginning of completion of the procedure. An 8 mm skin incision was made approximately 5 cm above the umbilicus near the midline.  Then, using a 5 mm Endopath Excel trocar and the 5 mm laparoscope, the peritoneal cavity was entered under directvisualization. Good intraperitoneal location was confirmed and pneumoperitoneum was created to maximal pressure 15 mm of mercury.  A total of four 8 mm robotic trocars were placed (2 on the left, 1 in the midline replacing the 5 mm entry port and 1 on the right) and the 5 mm entry trocar was reinserted in the right flank for assistant's use. The patient was placed in  steep Trendelenburg and the DaVinci system was docked.     The above-mentioned findings were noted.  The avascular line of Toldt was incised and the descending and sigmoid colon completely mobilized medially.  This allowed exposure of retroperitonealized left cystic ovarian mass.  Further mobilization of the rectum allowed clear identification of the left ureter which was below the aforementioned mass.  The thickened left utero-ovarian ligament and fallopian tube were cauterized and divided at the cornua.  During further dissection/mobilization of the mass the mass ruptured with the above-mentioned findings noted.  After ruptured it was easy to mobilize the collapsed cystic wall for complete transection of the left utero-ovarian ligament and fallopian tube.  The mass was then elevated and the left ovarian vessels clearly identified, skeletonized cauterized and divided with the vessel sealer device.    Large left external iliac lymph nodes were identified and completely excised using sharp dissection.  This dissection was carried from the bifurcation to the level of the deep circumflex iliac vein.  The genitofemoral nerve was clearly identified and avoided.  The enlarged lymph nodes were placed in a 5 mm Endo Catch bag for later retrieval through the vagina.    At this point we proceeded with right salpingectomy.  The right fallopian tube was cauterized and divided at the right cornua and the mesosalpinx divided towards the fimbria.  After complete mobilization which was accomplished with the vessel sealer device, the right fallopian tube was delivered through one of the 8 mm trocar sites.    Hysterectomy was then performed. The round ligaments were cauterized and divided bilaterally and the bladder was mobilized anteriorly without difficulty.  The ureters were clearly identified transperitoneally.  The ovarian vessels were skeletonized,cauterized and divided bilaterally.  The fallopian tubes and ovaries were  mobilized without difficulties.  The uterine vessels were skeletonized cauterized and divided bilaterally.  The cardinal ligaments were serially cauterized and divided and a circumferential colpotomy was made.  The uterine specimen, Endo Catch bag containing left external iliac lymph nodes and the left ovarian mass were removed through the vagina. The vaginal cuff was closed using a running stitch of 2-0 PDO Stratafix.  Airtight closure an excellent hemostasis was obtained.  Copious irrigation was used.  Initially, some minimal bleeding was noted in relation to the right vaginal angle.  This was far away from the course of the ureter which could be clearly identified.  This area was oversewn with 2 figure-of-eight stitches of 3-0 Vicryl and next hemostasis was confirmed.  Subsequent irrigation demonstrated excellent hemostasis at low intraperitoneal pressures.  At this point the robot was undocked. Pneumoperitoneum was completely released with the assistance of Valsalva maneuvers.  All trocars were removed and the skin was closed using a subcuticular stitch of 4-0 Monocryl and Exofin was applied to all incisions.     The Kemp was removed.  Using the Nezhat, the bladder was retrograde filled with approximately 150 mL of NS. We examined the bladder then using the 5 mm laparoscope.  Ureteral orifices were visualized.  Right ureteral jet readily noted.  Left ureteral jet could not be visualized for more than 20 minutes.  Therefore, plan was initially made by our gynecologic oncology fellow to possibly place a stent.  A 30 degree 22 Marshallese cystoscope was used to reassess and possibly place a stent.  At that time, potent left ureteral jet was noted twice.  Decision was made to forego stent placement.      The patient tolerated the procedure well. Sponge, lap, needle and instrument counts were reported as correct x2.  At the time of this dictation the patient remains stable in the operating room awaiting extubation.        I was present for the entire procedure except skin closure and cystoscopy which was completed by GYN oncology fellow assisted by physician assistant and OB/GYN resident.    Patient Disposition:  PACU     Shadi Strauss MD, FACOG, FACS  3/22/2024  12:40 PM

## 2024-03-26 PROCEDURE — 88307 TISSUE EXAM BY PATHOLOGIST: CPT | Performed by: STUDENT IN AN ORGANIZED HEALTH CARE EDUCATION/TRAINING PROGRAM

## 2024-03-26 PROCEDURE — 88302 TISSUE EXAM BY PATHOLOGIST: CPT | Performed by: STUDENT IN AN ORGANIZED HEALTH CARE EDUCATION/TRAINING PROGRAM

## 2024-03-26 PROCEDURE — 88305 TISSUE EXAM BY PATHOLOGIST: CPT | Performed by: STUDENT IN AN ORGANIZED HEALTH CARE EDUCATION/TRAINING PROGRAM

## 2024-04-08 ENCOUNTER — TELEPHONE (OUTPATIENT)
Dept: GYNECOLOGIC ONCOLOGY | Facility: CLINIC | Age: 39
End: 2024-04-08

## 2024-04-08 NOTE — TELEPHONE ENCOUNTER
----- Message from Shilpa Tian sent at 4/8/2024  7:49 AM EDT -----  Regarding: FW: Bleeding with clots   Contact: [phone]    ----- Message -----  From: Anabel Alvarado  Sent: 4/6/2024   2:05 PM EDT  To: Gyn Oncology [place] [facility]  Subject: Bleeding with clots                              I am 15 days post op   And last night I started bleeding bright red blood   It didn't soak apad but it hasn't really stopped either.  It is now dark brown coming out and I have passed 4 clots with it that are Dime sized   Do I need to be seen?  I only have dull pain on left side it comes and goes and I'm not taking any pain medication  not even ibuprofen because it's not horrible

## 2024-04-09 ENCOUNTER — OFFICE VISIT (OUTPATIENT)
Dept: GYNECOLOGIC ONCOLOGY | Facility: CLINIC | Age: 39
End: 2024-04-09

## 2024-04-09 VITALS
HEIGHT: 60 IN | DIASTOLIC BLOOD PRESSURE: 90 MMHG | TEMPERATURE: 97.5 F | SYSTOLIC BLOOD PRESSURE: 128 MMHG | WEIGHT: 216 LBS | BODY MASS INDEX: 42.41 KG/M2 | OXYGEN SATURATION: 98 % | HEART RATE: 74 BPM

## 2024-04-09 DIAGNOSIS — Z09 POSTOP CHECK: Primary | ICD-10-CM

## 2024-04-09 PROBLEM — N93.9 ABNORMAL UTERINE BLEEDING (AUB): Status: RESOLVED | Noted: 2023-12-19 | Resolved: 2024-04-09

## 2024-04-09 PROBLEM — N83.8 LEFT TUBO-OVARIAN MASS: Status: RESOLVED | Noted: 2023-12-19 | Resolved: 2024-04-09

## 2024-04-09 PROCEDURE — 99024 POSTOP FOLLOW-UP VISIT: CPT | Performed by: NURSE PRACTITIONER

## 2024-04-09 NOTE — ASSESSMENT & PLAN NOTE
38-year-old who is s/p TLH, bilateral salpingectomy, and left oophorectomy for AUB and left tubo-ovarian mass on 3/21/24. Pathology from surgery was fortunately benign. In the past 48 hours, she had an episode of heavy bleeding, with clots, that turned to brown spotting. This has since stopped and not recurred. Gentle speculum exam reveals an intact vaginal cuff with no bleeding noted. There is scant brown discharge. Her PS is 0.    Patient will resume routine gyn care with her primary gynecologist. She will call in the interim with any new bleeding or other concerns.    Advised her to maintain pelvic rest, with no heavy lifting, through week 6 postop. Return to work note provided for 5/6/24.

## 2024-04-09 NOTE — PATIENT INSTRUCTIONS
1. Return to primary gynecologist for routine gynecologic care.  2. Call the office if you develop any new or concerning symptoms related to your recent surgery (new pain, bleeding, discharge, fevers, etc.)

## 2024-04-09 NOTE — PROGRESS NOTES
"Assessment/Plan:    Problem List Items Addressed This Visit          Orthopedic/Musculoskeletal    Postop check - Primary     38-year-old who is s/p TLH, bilateral salpingectomy, and left oophorectomy for AUB and left tubo-ovarian mass. Pathology from surgery was fortunately benign. In the past 48 hours, she had an episode of heavy bleeding, with clots, that turned to brown spotting. This has since stopped and not recurred. Gentle speculum exam reveals an intact vaginal cuff with no bleeding noted. There is scant brown discharge. Her PS is 0.    Patient will resume routine gyn care with her primary gynecologist. She will call in the interim with any new bleeding or other concerns.    Advised her to maintain pelvic rest, with no heavy lifting, through week 6 postop. Return to work note provided for 5/6/24.              CHIEF COMPLAINT: Postop check       Problem:  Cancer Staging   No matching staging information was found for the patient.        Previous therapy:  Oncology History    No history exists.         Patient ID: Anabel Alvarado is a 38 y.o. female    This is a 38 y.o. female who presents to the office for post-operative evaluation. She is without acute complaints. She noted an episode of heavy vaginal bleeding overnight 2 nights ago, which \"soaked\" her bed, with clots present. This lessened to brown spotting and then stopped by yesterday morning. She has been afebrile. She denies nausea or vomiting. Her appetite is appropriate. She reports normal bowel and bladder function. She denies vaginal bleeding or discharge. She is without abdominal or pelvic pain. She is ambulatory.          The following portions of the patient's history were reviewed and updated as appropriate: allergies, current medications, past family history, past medical history, past social history, past surgical history, and problem list.    Review of Systems   Constitutional:  Negative for activity change, appetite change, chills, fatigue, " fever and unexpected weight change.   HENT:  Negative for mouth sores and nosebleeds.    Eyes: Negative.    Respiratory:  Negative for cough, chest tightness, shortness of breath and wheezing.    Cardiovascular:  Negative for chest pain, palpitations and leg swelling.   Gastrointestinal:  Negative for abdominal distention, abdominal pain, anal bleeding, blood in stool, constipation, diarrhea, nausea, rectal pain and vomiting.   Endocrine: Negative.    Genitourinary:  Negative for difficulty urinating, dysuria, flank pain, frequency, hematuria, pelvic pain, urgency, vaginal bleeding, vaginal discharge and vaginal pain.   Musculoskeletal:  Negative for arthralgias, joint swelling and myalgias.   Skin:  Negative for color change, pallor and rash.   Neurological:  Negative for dizziness, weakness, light-headedness, numbness and headaches.   Hematological: Negative.    Psychiatric/Behavioral: Negative.  The patient is not nervous/anxious.          Current Outpatient Medications:     Aloe LIQD, Take 4 fluid ounces by mouth every morning, Disp: , Rfl:     amLODIPine (NORVASC) 10 mg tablet, Take 10 mg by mouth daily, Disp: , Rfl:     FLUoxetine (PROzac) 20 mg capsule, Take 1 capsule by mouth every morning, Disp: , Rfl:     ibuprofen (MOTRIN) 400 mg tablet, Take by mouth every 6 (six) hours as needed for mild pain, Disp: , Rfl:     levothyroxine 25 mcg tablet, Take 25 mcg by mouth daily, Disp: , Rfl:     losartan (COZAAR) 50 mg tablet, Take 50 mg by mouth daily, Disp: , Rfl:     Objective:    Blood pressure 128/90, pulse 74, temperature 97.5 °F (36.4 °C), temperature source Temporal, height 5' (1.524 m), weight 98 kg (216 lb), SpO2 98%.  Body mass index is 42.18 kg/m².  Body surface area is 1.93 meters squared.    Physical Exam  Vitals reviewed. Exam conducted with a chaperone present.   Constitutional:       General: She is not in acute distress.     Appearance: Normal appearance.   HENT:      Head: Normocephalic and  atraumatic.      Mouth/Throat:      Mouth: Mucous membranes are moist.   Abdominal:      Palpations: Abdomen is soft. There is no mass.      Tenderness: There is no abdominal tenderness.   Genitourinary:     Comments: The external female genitalia is normal. The bartholin's, uretheral and skenes glands are normal. The urethral meatus is normal (midline with no lesions). Anus without fissure or lesion. Speculum exam reveals a grossly normal vagina. Vagina is intact, without dehiscense. No masses, lesions, or bleeding. Scant brown discharge noted. No significant cystocele or rectocele noted. Bimanual exam notes a surgical absent cervix, uterus and adnexal structures. No masses or fullness. Bladder is without fullness, mass or tenderness.  Skin:     General: Skin is warm and dry.      Comments: Surgical trocar sites are well healed.    Neurological:      Mental Status: She is alert and oriented to person, place, and time.   Psychiatric:         Mood and Affect: Mood normal.         Behavior: Behavior normal.         Thought Content: Thought content normal.         Judgment: Judgment normal.           Radiology:  Case Report   Surgical Pathology Report                         Case: [id number]                                   Authorizing Provider:  Shadi Strauss MD      Collected:           03/21/2024 1447               Ordering Location:     [facility]      Received:            03/21/2024 1917                                      Hospital Operating Room                                                       Pathologist:           Vladislav Gomez MD                                                             Intraop:               Vladislav Gomez MD                                                             Specimens:   A) - Lymph Node, LEFT EXTERNAL ILIAC ENLARGED LYMPH NODES                                            B) - Fallopian Tube, Right                                                                           C) - Ovary, Left, left fallopian tube and left ovary                                                D) - Uterus, uterus and cervix                                                            Final Diagnosis   A. Lymph nodes, left external iliac, excision:   - Eleven benign lymph nodes.      B. Fallopian tube, right salpingectomy:   - Benign fallopian tube with focal endometriosis.      C. Ovary and fallopian tube, left salpingo-oophorectomy:   - Ovary with endometriotic cyst.   - Unremarkable fallopian tube.      D. Uterus and cervix, total hysterectomy:   - Endometrium: Proliferative.   - Myometrium: Multiple leiomyomas.   - Cervix: Nabothian cysts.         Comments:   This is an appended report. These results have been appended to a previously preliminary verified report.      Electronically signed by Vladislav Gomez MD on 3/26/2024 at  3:57 PM

## (undated) DEVICE — CANNULA SEAL

## (undated) DEVICE — 3000CC GUARDIAN II: Brand: GUARDIAN

## (undated) DEVICE — TIP COVER ACCESSORY

## (undated) DEVICE — TRAY FOLEY 16FR URIMETER SILICONE SURESTEP

## (undated) DEVICE — INSUFLATION TUBING INSUFLOW (LEXION)

## (undated) DEVICE — GLOVE INDICATOR PI UNDERGLOVE SZ 7 BLUE

## (undated) DEVICE — 1820 FOAM BLOCK NEEDLE COUNTER: Brand: DEVON

## (undated) DEVICE — ANTIBACTERIAL VIOLET BRAIDED (POLYGLACTIN 910), SYNTHETIC ABSORBABLE SUTURE: Brand: COATED VICRYL

## (undated) DEVICE — UTERINE MANIPULATOR RUMI 6.7 X 10 CM

## (undated) DEVICE — SUT MONOCRYL 4-0 PS-2 27 IN Y426H

## (undated) DEVICE — CHLORHEXIDINE 4PCT 4 OZ

## (undated) DEVICE — MONOPOLAR CURVED SCISSORS: Brand: ENDOWRIST

## (undated) DEVICE — GLOVE PI ULTRA TOUCH SZ.8.5

## (undated) DEVICE — VESSEL SEALER EXTEND: Brand: ENDOWRIST

## (undated) DEVICE — SYRINGE 50ML LL

## (undated) DEVICE — VISUALIZATION SYSTEM: Brand: CLEARIFY

## (undated) DEVICE — PREMIUM DRY TRAY LF: Brand: MEDLINE INDUSTRIES, INC.

## (undated) DEVICE — MAYO STAND COVER: Brand: CONVERTORS

## (undated) DEVICE — LARGE NEEDLE DRIVER: Brand: ENDOWRIST

## (undated) DEVICE — TISSUE RETRIEVAL SYSTEM: Brand: INZII RETRIEVAL SYSTEM

## (undated) DEVICE — SYRINGE 10ML LL

## (undated) DEVICE — INTENDED FOR TISSUE SEPARATION, AND OTHER PROCEDURES THAT REQUIRE A SHARP SURGICAL BLADE TO PUNCTURE OR CUT.: Brand: BARD-PARKER SAFETY BLADES SIZE 15, STERILE

## (undated) DEVICE — ELECTRO LUBE IS A SINGLE PATIENT USE DEVICE THAT IS INTENDED TO BE USED ON ELECTROSURGICAL ELECTRODES TO REDUCE STICKING.: Brand: KEY SURGICAL ELECTRO LUBE

## (undated) DEVICE — ADHESIVE SKIN HIGH VISCOSITY EXOFIN 1ML

## (undated) DEVICE — UTERINE MANIPULATOR RUMI 6.7 X 8 CM

## (undated) DEVICE — INTENDED FOR TISSUE SEPARATION, AND OTHER PROCEDURES THAT REQUIRE A SHARP SURGICAL BLADE TO PUNCTURE OR CUT.: Brand: BARD-PARKER SAFETY BLADES SIZE 11, STERILE

## (undated) DEVICE — FENESTRATED BIPOLAR FORCEPS: Brand: ENDOWRIST

## (undated) DEVICE — GLOVE INDICATOR PI UNDERGLOVE SZ 8.5 BLUE

## (undated) DEVICE — ARM DRAPE

## (undated) DEVICE — COLUMN DRAPE

## (undated) DEVICE — KIT, BETHLEHEM ROBOTIC PROST: Brand: CARDINAL HEALTH

## (undated) DEVICE — TROCAR: Brand: KII SLEEVE